# Patient Record
Sex: MALE | Race: WHITE | NOT HISPANIC OR LATINO | URBAN - METROPOLITAN AREA
[De-identification: names, ages, dates, MRNs, and addresses within clinical notes are randomized per-mention and may not be internally consistent; named-entity substitution may affect disease eponyms.]

---

## 2017-07-24 ENCOUNTER — OFFICE (OUTPATIENT)
Dept: URBAN - METROPOLITAN AREA CLINIC 11 | Facility: CLINIC | Age: 62
End: 2017-07-24
Payer: COMMERCIAL

## 2017-07-24 VITALS — WEIGHT: 248 LBS | HEART RATE: 51 BPM | DIASTOLIC BLOOD PRESSURE: 73 MMHG | SYSTOLIC BLOOD PRESSURE: 142 MMHG

## 2017-07-24 DIAGNOSIS — E66.9 OBESITY, UNSPECIFIED: ICD-10-CM

## 2017-07-24 DIAGNOSIS — K76.0 FATTY (CHANGE OF) LIVER, NOT ELSEWHERE CLASSIFIED: ICD-10-CM

## 2017-07-24 PROCEDURE — 99203 OFFICE O/P NEW LOW 30 MIN: CPT | Performed by: INTERNAL MEDICINE

## 2017-07-24 NOTE — SERVICENOTES
We discussed his results and normal LFTS.  We discussed the differences in fatty liver changes, YOO/NALFD and the screening with the elastography, u/s for f/u and labs.  We discussed associated issues such as metabolic syndrome, the connection to the triglcyeride issues, and his weight.  We discussed weight loss options, exercise and taht at this point, I would not rec weight loss surgery though his wife was interested in it.

## 2017-07-24 NOTE — SERVICEHPINOTES
He stated that last year he was found to have fatty liver changes and was referred but did not come in.  He stated that he had fatty changes on an u/s.  He has also nad issues with triglycerides and is being treated for a few years.  He has not had diabetes or thryoid disease.  He does not drink ETOH.  He has had hypertension which is controlled with meds.  He has not had a family history of liver disease.  He has ahd weight issues and was exercising.His labs and studies in [facility] were noted over the past year.  He has had normal LFTS.  There were some fatty changes of the liver on the u/s with some hepatomegally.  His spleen was 12.9cm.  BR

## 2017-08-22 ENCOUNTER — OFFICE (OUTPATIENT)
Dept: URBAN - METROPOLITAN AREA CLINIC 14 | Facility: CLINIC | Age: 62
End: 2017-08-22
Payer: COMMERCIAL

## 2017-08-22 DIAGNOSIS — K76.0 FATTY (CHANGE OF) LIVER, NOT ELSEWHERE CLASSIFIED: ICD-10-CM

## 2017-08-22 PROCEDURE — 91200 LIVER ELASTOGRAPHY: CPT | Performed by: INTERNAL MEDICINE

## 2017-10-02 ENCOUNTER — OFFICE (OUTPATIENT)
Dept: URBAN - METROPOLITAN AREA CLINIC 11 | Facility: CLINIC | Age: 62
End: 2017-10-02
Payer: COMMERCIAL

## 2017-10-02 VITALS
HEIGHT: 68 IN | HEART RATE: 49 BPM | DIASTOLIC BLOOD PRESSURE: 71 MMHG | WEIGHT: 241 LBS | SYSTOLIC BLOOD PRESSURE: 126 MMHG

## 2017-10-02 DIAGNOSIS — E66.9 OBESITY, UNSPECIFIED: ICD-10-CM

## 2017-10-02 DIAGNOSIS — K76.0 FATTY (CHANGE OF) LIVER, NOT ELSEWHERE CLASSIFIED: ICD-10-CM

## 2017-10-02 LAB
HEPATIC FUNCTION PANEL (7): ALBUMIN, SERUM: 4.6 G/DL (ref 3.6–4.8)
HEPATIC FUNCTION PANEL (7): ALKALINE PHOSPHATASE, S: 113 IU/L (ref 39–117)
HEPATIC FUNCTION PANEL (7): ALT (SGPT): 18 IU/L (ref 0–44)
HEPATIC FUNCTION PANEL (7): AST (SGOT): 21 IU/L (ref 0–40)
HEPATIC FUNCTION PANEL (7): BILIRUBIN, DIRECT: 0.2 MG/DL (ref 0–0.4)
HEPATIC FUNCTION PANEL (7): BILIRUBIN, TOTAL: 0.5 MG/DL (ref 0–1.2)
HEPATIC FUNCTION PANEL (7): PROTEIN, TOTAL, SERUM: 7.4 G/DL (ref 6–8.5)

## 2017-10-02 PROCEDURE — 99213 OFFICE O/P EST LOW 20 MIN: CPT | Performed by: INTERNAL MEDICINE

## 2017-10-02 NOTE — SERVICENOTES
We discussed the normal fibroscan, fatty changes on u/s, and his previously normal LFTS.  He appears to have a benign fatty liver change and not YOO/NAFLD.  I will do his f/u LFTS today and if normal, he can f/u with us as needed.  We discussed his weightloss plan/diet.  We also discussed his f/u colon in 2024.

## 2017-10-02 NOTE — SERVICEHPINOTES
He presents to f/u of his fatty liver changes.  He has been on a lower carb diet and has lost about 10lbs.  he has been out of his neck brace and is exercising as well.  He has not had n/v, d/c, abdomninal pain, reflux issues or such.  He has had his fibroscan with a 4.8 result (no fibrosis/cirrhosis).  He has had some fatty changes of the liver on u/s.

## 2024-10-30 ENCOUNTER — OFFICE (OUTPATIENT)
Dept: URBAN - METROPOLITAN AREA CLINIC 9 | Facility: CLINIC | Age: 69
End: 2024-10-30
Payer: MEDICARE

## 2024-10-30 VITALS
OXYGEN SATURATION: 94 % | HEIGHT: 68 IN | WEIGHT: 225 LBS | RESPIRATION RATE: 14 BRPM | DIASTOLIC BLOOD PRESSURE: 64 MMHG | SYSTOLIC BLOOD PRESSURE: 138 MMHG | HEART RATE: 71 BPM

## 2024-10-30 DIAGNOSIS — K76.0 FATTY (CHANGE OF) LIVER, NOT ELSEWHERE CLASSIFIED: ICD-10-CM

## 2024-10-30 DIAGNOSIS — R19.5 OTHER FECAL ABNORMALITIES: ICD-10-CM

## 2024-10-30 PROCEDURE — 99204 OFFICE O/P NEW MOD 45 MIN: CPT | Performed by: NURSE PRACTITIONER

## 2024-10-30 RX ORDER — SODIUM PICOSULFATE, MAGNESIUM OXIDE, AND ANHYDROUS CITRIC ACID 12; 3.5; 1 G/175ML; G/175ML; MG/175ML
LIQUID ORAL
Qty: 1 | Refills: 0 | Status: ACTIVE
Start: 2024-10-30

## 2024-11-02 LAB
NASH FIBROSURE(R) PLUS: ALPHA 2-MACROGLOBULINS, QN: 167 MG/DL (ref 110–276)
NASH FIBROSURE(R) PLUS: ALT (SGPT) P5P: 29 IU/L (ref 0–55)
NASH FIBROSURE(R) PLUS: APOLIPOPROTEIN A-1: 147 MG/DL (ref 101–178)
NASH FIBROSURE(R) PLUS: AST (SGOT) P5P: 24 IU/L (ref 0–40)
NASH FIBROSURE(R) PLUS: BILIRUBIN, TOTAL: 0.5 MG/DL (ref 0–1.2)
NASH FIBROSURE(R) PLUS: CHOLESTEROL, TOTAL: 129 MG/DL (ref 100–199)
NASH FIBROSURE(R) PLUS: COMMENT: (no result)
NASH FIBROSURE(R) PLUS: FIBROSIS SCORE: 0.19 (ref 0–0.21)
NASH FIBROSURE(R) PLUS: FIBROSIS SCORING: (no result)
NASH FIBROSURE(R) PLUS: FIBROSIS STAGE: (no result)
NASH FIBROSURE(R) PLUS: GGT: 25 IU/L (ref 0–65)
NASH FIBROSURE(R) PLUS: GLUCOSE, SERUM: 104 MG/DL — HIGH (ref 70–99)
NASH FIBROSURE(R) PLUS: HAPTOGLOBIN: 209 MG/DL (ref 32–363)
NASH FIBROSURE(R) PLUS: INTERPRETATIONS: (no result)
NASH FIBROSURE(R) PLUS: LIMITATIONS: (no result)
NASH FIBROSURE(R) PLUS: METHODOLOGY: (no result)
NASH FIBROSURE(R) PLUS: NASH GRADE: (no result)
NASH FIBROSURE(R) PLUS: NASH SCORE: 0.62 — HIGH (ref 0–0.25)
NASH FIBROSURE(R) PLUS: NASH SCORING: (no result)
NASH FIBROSURE(R) PLUS: STEATOSIS GRADE: (no result)
NASH FIBROSURE(R) PLUS: STEATOSIS SCORE: 0.46 — HIGH (ref 0–0.4)
NASH FIBROSURE(R) PLUS: STEATOSIS SCORING: (no result)
NASH FIBROSURE(R) PLUS: TRIGLYCERIDES: 106 MG/DL (ref 0–149)

## 2024-12-19 ENCOUNTER — AMBULATORY SURGICAL CENTER (OUTPATIENT)
Dept: URBAN - METROPOLITAN AREA SURGERY 3 | Facility: SURGERY | Age: 69
End: 2024-12-19
Payer: MEDICARE

## 2024-12-19 ENCOUNTER — OFFICE (OUTPATIENT)
Dept: URBAN - METROPOLITAN AREA PATHOLOGY 12 | Facility: PATHOLOGY | Age: 69
End: 2024-12-19
Payer: MEDICARE

## 2024-12-19 VITALS
HEART RATE: 77 BPM | TEMPERATURE: 98 F | HEART RATE: 59 BPM | SYSTOLIC BLOOD PRESSURE: 109 MMHG | DIASTOLIC BLOOD PRESSURE: 71 MMHG | SYSTOLIC BLOOD PRESSURE: 126 MMHG | SYSTOLIC BLOOD PRESSURE: 130 MMHG | DIASTOLIC BLOOD PRESSURE: 78 MMHG | RESPIRATION RATE: 21 BRPM | HEART RATE: 67 BPM | OXYGEN SATURATION: 96 % | DIASTOLIC BLOOD PRESSURE: 75 MMHG | TEMPERATURE: 98.2 F | SYSTOLIC BLOOD PRESSURE: 156 MMHG | OXYGEN SATURATION: 97 % | OXYGEN SATURATION: 98 % | SYSTOLIC BLOOD PRESSURE: 117 MMHG | RESPIRATION RATE: 16 BRPM | HEIGHT: 68 IN | HEART RATE: 58 BPM | WEIGHT: 226 LBS | RESPIRATION RATE: 18 BRPM

## 2024-12-19 DIAGNOSIS — Z12.11 ENCOUNTER FOR SCREENING FOR MALIGNANT NEOPLASM OF COLON: ICD-10-CM

## 2024-12-19 DIAGNOSIS — R19.5 OTHER FECAL ABNORMALITIES: ICD-10-CM

## 2024-12-19 DIAGNOSIS — K63.5 POLYP OF COLON: ICD-10-CM

## 2024-12-19 PROCEDURE — 88305 TISSUE EXAM BY PATHOLOGIST: CPT | Performed by: PATHOLOGY

## 2024-12-19 PROCEDURE — 45380 COLONOSCOPY AND BIOPSY: CPT | Mod: PT | Performed by: INTERNAL MEDICINE

## 2024-12-21 LAB — GASTRO ONE PATHOLOGY: PDF REPORT: (no result)
